# Patient Record
Sex: FEMALE | Race: WHITE | NOT HISPANIC OR LATINO | URBAN - METROPOLITAN AREA
[De-identification: names, ages, dates, MRNs, and addresses within clinical notes are randomized per-mention and may not be internally consistent; named-entity substitution may affect disease eponyms.]

---

## 2017-01-30 ENCOUNTER — OFFICE (OUTPATIENT)
Dept: URBAN - METROPOLITAN AREA CLINIC 11 | Facility: CLINIC | Age: 67
End: 2017-01-30
Payer: OTHER GOVERNMENT

## 2017-01-30 VITALS
WEIGHT: 133 LBS | DIASTOLIC BLOOD PRESSURE: 67 MMHG | HEIGHT: 61 IN | SYSTOLIC BLOOD PRESSURE: 135 MMHG | HEART RATE: 88 BPM

## 2017-01-30 DIAGNOSIS — M43.8X9 OTHER SPECIFIED DEFORMING DORSOPATHIES, SITE UNSPECIFIED: ICD-10-CM

## 2017-01-30 DIAGNOSIS — B96.81 HELICOBACTER PYLORI [H. PYLORI] AS THE CAUSE OF DISEASES CLA: ICD-10-CM

## 2017-01-30 DIAGNOSIS — J44.9 CHRONIC OBSTRUCTIVE PULMONARY DISEASE, UNSPECIFIED: ICD-10-CM

## 2017-01-30 DIAGNOSIS — K21.9 GASTRO-ESOPHAGEAL REFLUX DISEASE WITHOUT ESOPHAGITIS: ICD-10-CM

## 2017-01-30 DIAGNOSIS — R10.84 GENERALIZED ABDOMINAL PAIN: ICD-10-CM

## 2017-01-30 DIAGNOSIS — R14.0 ABDOMINAL DISTENSION (GASEOUS): ICD-10-CM

## 2017-01-30 DIAGNOSIS — F17.200 NICOTINE DEPENDENCE, UNSPECIFIED, UNCOMPLICATED: ICD-10-CM

## 2017-01-30 DIAGNOSIS — I10 ESSENTIAL (PRIMARY) HYPERTENSION: ICD-10-CM

## 2017-01-30 DIAGNOSIS — K25.9 GASTRIC ULCER, UNSPECIFIED AS ACUTE OR CHRONIC, WITHOUT HEMO: ICD-10-CM

## 2017-01-30 PROCEDURE — 99213 OFFICE O/P EST LOW 20 MIN: CPT | Performed by: INTERNAL MEDICINE

## 2017-01-30 PROCEDURE — G8427 DOCREV CUR MEDS BY ELIG CLIN: HCPCS | Performed by: INTERNAL MEDICINE

## 2017-01-30 NOTE — SERVICENOTES
The patient does appear to be doing better after [place].  It does appear that she has some reflux and I do recommend her going back on PPI once we have confirmed eradication of the H. pylori.  Hopefully this will also improve some of her bloating symptoms.  If taking PPI and probiotic does not help then I did offer like chills breath test versus empiric trial of Xifaxan.  She states that she would like to undergo empiric trial of Xifaxan.  I recommended that she call us back in 3-4 weeks and if not doing better we can send this in.  In the meantime I will check some basic blood work as above.  We did also discuss last modification.  I did recommend that she continue to refrain from NSAIDs as much as possible as she is at increased risk for gastric ulceration given her history.

## 2017-01-30 NOTE — SERVICEHPINOTES
Ms. Whitley is a 66-year-old female with significant COPD here for follow-up of abdominal pain. She was initially seen by me at [facility] in September 2016 when she was in the hospital for a hip fracture after falling. As part of her evaluation she underwent CT scan of the abdomen because she was having some nausea and vomiting at that time and ended up having evidence of a gastric ulcer on the greater curvature of the stomach. She underwent EGD by me at that time that did not confirm a 1.5 cm gastric antral ulcer near the level of the incisura along the right side. Biopsies were obtained both from the ulcer and the gastric mucosa and both of which were negative. She then underwent surveillance EGD in December 2016 which revealed a slightly enlarged for sending her height is hernia. The previously seen ulcer was healed with only a scar left in place. Because of some patchy erythema and persistent symptoms biopsies were again taken and she did have positive H. pylori this time. Small bowel biopsies were negative. She has since undergone treatment with concominant therapy and is now here for follow-up. She states that while on therapy her symptoms were better but she still had some issues with some bloating and gas. Since stopping her PPI she has had worsened reflux. She has also restarted taking Mobic and is interested also taking ibuprofen because of back pain. She states that she is taking Gas-X and Beano which does not help her bloating much. She has 2-3 times a day. She apparently had some diarrhea while on H. pylori therapy but this improved. She is not currently taking a probiotic. She has gained 5 pounds since her EGD.

## 2017-02-02 LAB
ENDOMYSIAL ANTIBODY, IGA: ENA IGA: NEGATIVE
H PYLORI BREATH TEST: <0.1 %
IMMUNOGLOBULIN A: 100 MG/DL (ref 70–400)
TISSUE TRANSGLUTAMINASE IGA AB: TTG IGA RESULT: <0.5 U/ML

## 2017-07-13 ENCOUNTER — OFFICE (OUTPATIENT)
Dept: URBAN - METROPOLITAN AREA CLINIC 14 | Facility: CLINIC | Age: 67
End: 2017-07-13
Payer: OTHER GOVERNMENT

## 2017-07-13 VITALS
HEART RATE: 77 BPM | SYSTOLIC BLOOD PRESSURE: 128 MMHG | WEIGHT: 133 LBS | HEIGHT: 61 IN | DIASTOLIC BLOOD PRESSURE: 64 MMHG

## 2017-07-13 DIAGNOSIS — R14.0 ABDOMINAL DISTENSION (GASEOUS): ICD-10-CM

## 2017-07-13 DIAGNOSIS — R10.30 LOWER ABDOMINAL PAIN, UNSPECIFIED: ICD-10-CM

## 2017-07-13 DIAGNOSIS — I10 ESSENTIAL (PRIMARY) HYPERTENSION: ICD-10-CM

## 2017-07-13 DIAGNOSIS — K59.00 CONSTIPATION, UNSPECIFIED: ICD-10-CM

## 2017-07-13 DIAGNOSIS — F17.200 NICOTINE DEPENDENCE, UNSPECIFIED, UNCOMPLICATED: ICD-10-CM

## 2017-07-13 DIAGNOSIS — K21.9 GASTRO-ESOPHAGEAL REFLUX DISEASE WITHOUT ESOPHAGITIS: ICD-10-CM

## 2017-07-13 DIAGNOSIS — J44.9 CHRONIC OBSTRUCTIVE PULMONARY DISEASE, UNSPECIFIED: ICD-10-CM

## 2017-07-13 DIAGNOSIS — B96.81 HELICOBACTER PYLORI [H. PYLORI] AS THE CAUSE OF DISEASES CLA: ICD-10-CM

## 2017-07-13 PROCEDURE — 99213 OFFICE O/P EST LOW 20 MIN: CPT | Performed by: INTERNAL MEDICINE

## 2017-07-13 PROCEDURE — G8427 DOCREV CUR MEDS BY ELIG CLIN: HCPCS | Performed by: INTERNAL MEDICINE

## 2017-07-13 NOTE — SERVICENOTES
The patient's symptoms of bloating appear to be related to a change in bowel habits with constipation and taking a stool softener.  The patient is convinced the stool softener cause or bloating.  She has since stopped this and is on MiraLax and has been doing better.  I recommended she start taking fiber and continue MiraLax as needed.  If this does not work for her we can also consider trial of Linzess or Trulance.  I did also recommend she start taking a probiotic and cut back on foods known to cause bloating.  I did have a very long conversation regarding the need for colon cancer screening the patient states that she is not interested at all and colonoscopy.  I offered noninvasive testing with Cologuard, however the patient states that she is not interested in this or any other noninvasive testing as well. She does understand the risk of an undiagnosed neoplasm or precancerous lesion.

## 2018-06-17 NOTE — SERVICEHPINOTES
Ms. Whitley is a 66-year-old female with significant COPD here for follow-up of bloating. She was initially seen by me at [facility] in September 2016 when she was in the hospital for a hip fracture after falling. As part of her evaluation she underwent CT scan of the abdomen because she was having some nausea and vomiting at that time and ended up having evidence of a gastric ulcer on the greater curvature of the stomach. She underwent EGD by me at that time that did not confirm a 1.5 cm gastric antral ulcer near the level of the incisura along the right side. Biopsies were obtained both from the ulcer and the gastric mucosa and both of which were negative. She then underwent surveillance EGD in December 2016 which revealed a slightly enlarged for sending her height is hernia. The previously seen ulcer was healed with only a scar left in place. Because of some patchy erythema and persistent symptoms biopsies were again taken and she did have positive H. pylori this time. Small bowel biopsies was negative. She has since undergone treatment with concominant therapy. She states that while on therapy her symptoms were better but she still had some issues with some bloating and gas. H pylori breath test was negative, indicating eradication. Celiac labs were also negative. When I last saw her she had also started taking NSAIDs again. We recommended that she restart taking a PPI and stop NSAIDs and she states that her bloating symptoms did improve. She was doing well up until last week when she started having some more constipation issues and because of this started taking a stool softener. She states that after starting the stool softener she started having some more lower abdominal bloating discomfort. She denies any fevers, chills, nausea, or vomiting. She states that because of her symptoms she called in and we recommended she stop the stool softener and start taking MiraLax, which she states has improved her symptoms to her bloating is now less and she is having more regular bowel movements. Her weight has been stable since our last visit. I did also have a long discussion with the patient regarding the need for colon cancer screening as she is overdue for this. The patient states that she has no interest at all in this.BR Intact

## 2021-07-01 ENCOUNTER — OFFICE (OUTPATIENT)
Dept: URBAN - METROPOLITAN AREA CLINIC 14 | Facility: CLINIC | Age: 71
End: 2021-07-01

## 2021-07-01 VITALS
DIASTOLIC BLOOD PRESSURE: 74 MMHG | WEIGHT: 132 LBS | HEIGHT: 59 IN | SYSTOLIC BLOOD PRESSURE: 174 MMHG | RESPIRATION RATE: 18 BRPM | OXYGEN SATURATION: 97 % | HEART RATE: 99 BPM

## 2021-07-01 DIAGNOSIS — K25.9 GASTRIC ULCER, UNSPECIFIED AS ACUTE OR CHRONIC, WITHOUT HEMO: ICD-10-CM

## 2021-07-01 DIAGNOSIS — R11.0 NAUSEA: ICD-10-CM

## 2021-07-01 DIAGNOSIS — I73.9 PERIPHERAL VASCULAR DISEASE, UNSPECIFIED: ICD-10-CM

## 2021-07-01 DIAGNOSIS — J44.9 CHRONIC OBSTRUCTIVE PULMONARY DISEASE, UNSPECIFIED: ICD-10-CM

## 2021-07-01 DIAGNOSIS — Z79.01 LONG TERM (CURRENT) USE OF ANTICOAGULANTS: ICD-10-CM

## 2021-07-01 PROCEDURE — 99204 OFFICE O/P NEW MOD 45 MIN: CPT | Performed by: INTERNAL MEDICINE

## 2021-07-01 NOTE — SERVICENOTES
The patient comes in with worse nausea which started after she started taking Eliquis.  This also started after her hospitalization for her vascular surgery.  She was last seen by us in 2017 because of some bloating and constipation.  It appears that her constipation is doing better at this time.  Her last EGD was in 2016 and at that time was found to have H pylori as well as a peptic ulcer.  This was healed on follow-up EGD.  She denies any NSAID use.  Given that she has not had an EGD in five years we will go ahead and proceed with this as well as gastric emptying study.  In the meantime we will have her continue her PPI and H2 blocker.  We can consider Zofran as necessary.  If her symptoms persist we can consider trial off of Eliquis at the approval of her vascular surgeon as this could be a cause of nausea. We can also consider small-bowel imaging.

## 2021-07-01 NOTE — SERVICEHPINOTES
Ms. Whitley is a 70 yr old female who presents with complaints of nausea. She stated the nausea started around 2-3 months ago. She is nauseous daily and feels like she needs to vomit. She has not had any episodes of vomiting. Her nausea is consistent and worsens with food containing meat seasoning and BBQ sauce. She has a history of GERD and is compliant with taking omeprazole and famotidine daily. She recently had a angioplasty bypass in Feb. 2021 and was started on Xarelto at that time. She underwent an EGD in September 15th of 2016 which showed gastric ulcers and mild esophagitis. She underwent a second EGD on December 9, 2016 which showed mild erythema and a hiatal hernia. Biopsies were taken which indicated a positive H pylori. She had a negative H. Pyloria breath test on January 30th, 2017. Patient states she has a daily bowel movement which is of normal consistency and continues to take probiotics daily.  She states that she is short of breath but that is due to her COPD from years of tobacco use.